# Patient Record
Sex: FEMALE | Race: WHITE
[De-identification: names, ages, dates, MRNs, and addresses within clinical notes are randomized per-mention and may not be internally consistent; named-entity substitution may affect disease eponyms.]

---

## 2018-09-17 ENCOUNTER — HOSPITAL ENCOUNTER (EMERGENCY)
Dept: HOSPITAL 97 - ER | Age: 83
Discharge: HOME | End: 2018-09-17
Payer: COMMERCIAL

## 2018-09-17 VITALS — SYSTOLIC BLOOD PRESSURE: 130 MMHG | OXYGEN SATURATION: 100 % | TEMPERATURE: 98.1 F | DIASTOLIC BLOOD PRESSURE: 80 MMHG

## 2018-09-17 DIAGNOSIS — Z95.818: ICD-10-CM

## 2018-09-17 DIAGNOSIS — T16.2XXA: Primary | ICD-10-CM

## 2018-09-17 DIAGNOSIS — I25.2: ICD-10-CM

## 2018-09-17 PROCEDURE — 99283 EMERGENCY DEPT VISIT LOW MDM: CPT

## 2018-09-17 NOTE — ER
Nurse's Notes                                                                                     

 [facility]                                                                

Name: Dnia Carson                                                                            

Age: 82 yrs                                                                                       

Sex: Female                                                                                       

: 1935                                                                                   

MRN: [id number]                                                                                   

Arrival Date: 2018                                                                          

Time: 19:11                                                                                       

Account#: [id number]                                                                            

Bed 11                                                                                            

Private MD: Karel Hernandez T                                                                        

Diagnosis: Foreign body in left ear                                                               

                                                                                                  

Presentation:                                                                                     

                                                                                             

19:47 Presenting complaint: Patient states: she feels there is mosquito on her left ear for   mg2 

      an hour already. Transition of care: patient was not received from another setting of       

      care. Onset of symptoms was 2018. Risk Assessment: Do you want to hurt        

      yourself or someone else? Patient reports no desire to harm self or others. Initial         

      Sepsis Screen: Does the patient meet any 2 criteria? No. Patient's initial sepsis           

      screen is negative. Does the patient have a suspected source of infection? No.              

      Patient's initial sepsis screen is negative. Care prior to arrival: None.                   

19:47 Method Of Arrival: Ambulatory                                                           mg2 

19:47 Acuity: REBEKAH 5                                                                           mg2 

                                                                                                  

Triage Assessment:                                                                                

20:39 General: Appears in no apparent distress. Behavior is calm, cooperative.                mg2 

                                                                                                  

Historical:                                                                                       

- Allergies:                                                                                      

19:49 No Known Allergies;                                                                     mg2 

- PMHx:                                                                                           

19:49 Diabetes - NIDDM; Myocardial infarction;                                                mg2 

- PSHx:                                                                                           

19:49 cardiac stents;                                                                         mg2 

                                                                                                  

- Immunization history:: Flu vaccine is not up to date.                                           

- Social history:: Smoking status: Patient/guardian denies using tobacco,                         

  Patient/guardian denies using alcohol, street drugs, IV drugs.                                  

- Ebola Screening: : No symptoms or risks identified at this time.                                

                                                                                                  

                                                                                                  

Screenin:38 Abuse screen: Denies threats or abuse. Denies injuries from another. Nutritional        mg2 

      screening: No deficits noted. Tuberculosis screening: No symptoms or risk factors           

      identified. Fall Risk None identified.                                                      

                                                                                                  

Assessment:                                                                                       

20:37 Reassessment: patient was relieved from the ns irrigation on her left ear. Pain: Denies mg2 

      pain. EENT: Ear canal clear on left ear.                                                    

                                                                                                  

Vital Signs:                                                                                      

19:48  / 80; Pulse 84; Resp 18; Temp 98.1; Pulse Ox 100% on R/A; Weight 70.31 kg;       mg2 

      Height 5 ft. 2 in. (157.48 cm);                                                             

19:48 Body Mass Index 28.35 (70.31 kg, 157.48 cm)                                             mg2 

                                                                                                  

ED Course:                                                                                        

19:11 Patient arrived in ED.                                                                  es  

19:12 Karel Hernandez MD is Private Physician.                                                   es  

19:30 Forrest Guardado, RN is Primary Nurse.                                                  mg2 

19:33 Ronnell Bobo MD is Attending Physician.                                            tw4 

19:36 Ronnell Bobo MD is Attending Physician.                                            tw4 

19:47 Triage completed.                                                                       mg2 

20:11 Karel Hernandez MD is Referral Physician.                                                  tw4 

20:38 No provider procedures requiring assistance completed. Patient did not have IV access   mg2 

      during this emergency room visit. Ear irrigation: Route left ear with Normal Saline         

      amount Other 100 Patient tolerated well.                                                    

20:39 Patient has correct armband on for positive identification. Pulse ox on. NIBP on.       mg2 

20:39 Arm band placed on.                                                                     mg2 

                                                                                                  

Administered Medications:                                                                         

No medications were administered                                                                  

                                                                                                  

                                                                                                  

Outcome:                                                                                          

20:12 Discharge ordered by MD.                                                                tw4 

20:39 Discharged to home ambulatory.                                                          mg2 

20:39 Condition: stable                                                                           

20:39 Discharge instructions given to patient, Instructed on discharge instructions, follow       

      up and referral plans. Demonstrated understanding of instructions, follow-up care.          

20:40 Patient left the ED.                                                                    mg2 

                                                                                                  

Signatures:                                                                                       

Valentine Dave                                                                                    

Ronnell Bobo MD MD   tw4                                                  

Forrest Guardado, RN                    RN   mg2                                                  

                                                                                                  

Corrections: (The following items were deleted from the chart)                                    

19:48 19:48  / 80; Pulse 84bpm; Resp 18bpm; Pulse Ox 100% RA; Temp 98.1F; mg2           mg2 

                                                                                                  

**************************************************************************************************

## 2018-09-17 NOTE — EDPHYS
Physician Documentation                                                                           

 [facility]                                                                

Name: Dina Carson                                                                            

Age: 82 yrs                                                                                       

Sex: Female                                                                                       

: 1935                                                                                   

MRN: [id number]                                                                                   

Arrival Date: 2018                                                                          

Time: 19:11                                                                                       

Account#: [id number]                                                                            

Bed 11                                                                                            

Private MD: Karel Hernandez T                                                                        

ED Physician Ronnell Bobo                                                                     

HPI:                                                                                              

                                                                                             

03:42 This 82 yrs old  Female presents to ER via Ambulatory with complaints of       tw4 

      Foreign Body In Ear.                                                                        

03:42 The patient presents with a foreign body sensation, presumably from an insect. The      tw4 

      complaints affect the left ear. Onset: The symptoms/episode began/occurred just prior       

      to arrival.                                                                                 

                                                                                                  

Historical:                                                                                       

- Allergies:                                                                                      

                                                                                             

19:49 No Known Allergies;                                                                     mg2 

- PMHx:                                                                                           

19:49 Diabetes - NIDDM; Myocardial infarction;                                                mg2 

- PSHx:                                                                                           

19:49 cardiac stents;                                                                         mg2 

                                                                                                  

- Immunization history:: Flu vaccine is not up to date.                                           

- Social history:: Smoking status: Patient/guardian denies using tobacco,                         

  Patient/guardian denies using alcohol, street drugs, IV drugs.                                  

- Ebola Screening: : No symptoms or risks identified at this time.                                

                                                                                                  

                                                                                                  

Vital Signs:                                                                                      

19:48  / 80; Pulse 84; Resp 18; Temp 98.1; Pulse Ox 100% on R/A; Weight 70.31 kg;       mg2 

      Height 5 ft. 2 in. (157.48 cm);                                                             

19:48 Body Mass Index 28.35 (70.31 kg, 157.48 cm)                                             mg2 

                                                                                                  

MDM:                                                                                              

19:40 Patient medically screened.                                                             tw4 

20:11 Data reviewed: vital signs, nurses notes. Counseling: I had a detailed discussion with  tw4 

      the patient and/or guardian regarding: the historical points, exam findings, and any        

      diagnostic results supporting the discharge/admit diagnosis. Special discussion: I          

      discussed with the patient/guardian in detail that at this point there is no indication     

      for admission to the hospital. It is understood, however, that if the symptoms persist      

      or worsen the patient needs to return immediately for re-evaluation.                        

                                                                                                  

Administered Medications:                                                                         

No medications were administered                                                                  

                                                                                                  

                                                                                                  

Disposition:                                                                                      

18 20:12 Discharged to Home. Impression: Foreign body in left ear.                          

- Condition is Stable.                                                                            

- Discharge Instructions: Ear Foreign Body, Easy-to-Read.                                         

                                                                                                  

- Medication Reconciliation Form, Thank You Letter, Antibiotic Education, Prescription            

  Opioid Use form.                                                                                

- Follow up: Karel Hernandez MD; When: Upon discharge from the Emergency Department;                 

  Reason: Recheck today's complaints, Re-evaluation by your physician.                            

- Problem is new.                                                                                 

- Symptoms have improved.                                                                         

                                                                                                  

                                                                                                  

                                                                                                  

Signatures:                                                                                       

Ronnell Bobo MD MD   tw4                                                  

Forrest Guardado, RN                    RN   mg2                                                  

                                                                                                  

Corrections: (The following items were deleted from the chart)                                    

20:40 20:12 2018 20:12 Discharged to Home. Impression: Foreign body in left ear.        mg2 

      Condition is Stable. Forms are Medication Reconciliation Form, Thank You Letter,            

      Antibiotic Education, Prescription Opioid Use. Follow up: Karel Hernandez; When: Upon            

      discharge from the Emergency Department; Reason: Recheck today's complaints,                

      Re-evaluation by your physician. Problem is new. Symptoms have improved. tw4                

                                                                                                  

**************************************************************************************************

## 2021-12-06 ENCOUNTER — HOSPITAL ENCOUNTER (OUTPATIENT)
Dept: HOSPITAL 97 - DS | Age: 86
Discharge: HOME | End: 2021-12-06
Attending: INTERNAL MEDICINE
Payer: COMMERCIAL

## 2021-12-06 VITALS — OXYGEN SATURATION: 97 % | TEMPERATURE: 98 F

## 2021-12-06 VITALS — BODY MASS INDEX: 28.3 KG/M2

## 2021-12-06 VITALS — DIASTOLIC BLOOD PRESSURE: 51 MMHG | SYSTOLIC BLOOD PRESSURE: 142 MMHG

## 2021-12-06 DIAGNOSIS — N17.9: Primary | ICD-10-CM

## 2021-12-06 PROCEDURE — 0TB13ZX EXCISION OF LEFT KIDNEY, PERCUTANEOUS APPROACH, DIAGNOSTIC: ICD-10-PCS

## 2021-12-06 PROCEDURE — 88300 SURGICAL PATH GROSS: CPT

## 2021-12-06 PROCEDURE — 77012 CT SCAN FOR NEEDLE BIOPSY: CPT

## 2021-12-06 PROCEDURE — BT22ZZZ COMPUTERIZED TOMOGRAPHY (CT SCAN) OF LEFT KIDNEY: ICD-10-PCS

## 2021-12-06 PROCEDURE — 50200 RENAL BIOPSY PERQ: CPT

## 2021-12-06 NOTE — RAD REPORT
EXAM DESCRIPTION:  CT - Renal Biopsy CT - 12/6/2021 10:59 am

 

CLINICAL HISTORY:  ACUTE RENAL FAILURE

Flank pain

 

COMPARISON:  Urinary Bladder dated 7/21/2021

 

FINDINGS:  Preoperative diagnosis: Acute renal failure

Post operative diagnosis: Same

Conscious Sedation: 45 minutes of IV conscious sedation utilizing fentanyl and midazolam.

Patient was continuously monitored by nursing staff.

 

Contrast used: NONE

Estimated blood loss: less than 5 mL

Specimens: 2 x 18 gauge core specimens

 

The patient was placed prone on the table and the left posterior flank area was prepped and draped in
 the usual sterile fashion. 1% lidocaine was infiltrated into the subcutaneous tissues for local anes
thesia. Under computed tomographic guidance, a 17 gauge introducer was advanced into the lower pole l
eft kidney. Subsequently, a 18 gauge, 15 cm long, 20 mm throw core biopsy gun was advanced into the l
esion and 2 cores were obtained.

 

Postprocedure imaging demonstrated no complications. Samples were given to pathology for analysis. Th
e patient tolerated the procedure without immediate complication and transferred to the recovery room
 in stable condition.

 

 

IMPRESSION:  Successful CT-guided nonfocal left renal biopsy.

 

45 minutes of monitored IV conscious sedation was utilized.

 

All CT scans are performed using dose optimization technique as appropriate and may include automated
 exposure control or mA/KV adjustment according to patient size.

## 2024-09-10 LAB
ANION GAP SERPL CALC-SCNC: 9.3 MEQ/L (ref 5–15)
APTT BLD: 34.9 SECONDS (ref 24.3–36.9)
BUN BLD-MCNC: 20 MG/DL (ref 7–18)
GLUCOSE SERPLBLD-MCNC: 98 MG/DL (ref 74–106)
HCT VFR BLD CALC: 38.3 % (ref 36–45)
HGB BLD-MCNC: 12.6 G/DL (ref 12–15)
INR BLD: 1.03
LYMPHOCYTES # SPEC AUTO: 1.5 K/UL (ref 0.7–4.9)
MCH RBC QN AUTO: 29.2 PG (ref 27–35)
MCHC RBC AUTO-ENTMCNC: 32.9 G/DL (ref 32–36)
MCV RBC: 88.9 FL (ref 80–100)
NRBC # BLD: 0 10*3/UL (ref 0–0)
NRBC BLD AUTO-RTO: 0.1 % (ref 0–0)
PMV BLD: 7.2 FL (ref 7.6–11.3)
POTASSIUM SERPL-SCNC: 5.3 MEQ/L (ref 3.5–5.1)
PROTHROMBIN TIME: 11.5 SECONDS (ref 9.4–12.5)
RBC # BLD: 4.3 M/UL (ref 3.86–4.86)
WBC # BLD AUTO: 5.6 THOU/UL (ref 4.3–10.9)

## 2024-09-10 NOTE — RAD REPORT
EXAM DESCRIPTION:  RAD - Chest Pa And Lat (2 Views) - 9/10/2024 2:30 pm

 

CLINICAL HISTORY:  pre op

Chest pain.

 

COMPARISON:  Chest Pa And Lat (2 Views) dated 2/16/2023; Chest Pa And Lat (2 Views) dated 12/4/2019; 
CHEST PA AND LAT 2 VIEW dated 3/1/2016; CHEST PA AND LAT 2 VIEW dated 4/20/2010

 

TECHNIQUE:  PA and lateral views of the chest were obtained.

 

FINDINGS:  The lungs are hyperexpanded compatible with COPD. The heart is upper limit of normal in si
ze. No fracture or aggressive bony process. Small hiatal hernia.

 

IMPRESSION:  COPD without acute process identified.

## 2024-09-10 NOTE — EKG
Test Date:    2024-09-10               Test Time:    14:19:16

Technician:   ROSHAN                                     

                                                     

MEASUREMENT RESULTS:                                       

Intervals:                                           

Rate:         78                                     

AL:           176                                    

QRSD:         76                                     

QT:           374                                    

QTc:          426                                    

Axis:                                                

P:            31                                     

AL:           176                                    

QRS:          15                                     

T:            20                                     

                                                     

INTERPRETIVE STATEMENTS:                                       

                                                     

Normal sinus rhythm

Cannot rule out Anterior infarct, age undetermined

Abnormal ECG

Compared to ECG 04/16/2006 04:46:00

No significant changes



Electronically Signed On 09-10-24 16:52:09 CDT by Aiden Stauffer

## 2024-09-12 ENCOUNTER — HOSPITAL ENCOUNTER (OUTPATIENT)
Dept: HOSPITAL 97 - CCL | Age: 89
Discharge: HOME | End: 2024-09-12
Attending: INTERNAL MEDICINE
Payer: COMMERCIAL

## 2024-09-12 VITALS — SYSTOLIC BLOOD PRESSURE: 154 MMHG | DIASTOLIC BLOOD PRESSURE: 74 MMHG

## 2024-09-12 VITALS — OXYGEN SATURATION: 95 %

## 2024-09-12 DIAGNOSIS — Z79.899: ICD-10-CM

## 2024-09-12 DIAGNOSIS — E78.5: ICD-10-CM

## 2024-09-12 DIAGNOSIS — Z79.84: ICD-10-CM

## 2024-09-12 DIAGNOSIS — N18.30: ICD-10-CM

## 2024-09-12 DIAGNOSIS — Z87.891: ICD-10-CM

## 2024-09-12 DIAGNOSIS — E11.22: ICD-10-CM

## 2024-09-12 DIAGNOSIS — I12.9: ICD-10-CM

## 2024-09-12 DIAGNOSIS — I34.0: ICD-10-CM

## 2024-09-12 DIAGNOSIS — I25.10: ICD-10-CM

## 2024-09-12 DIAGNOSIS — I65.22: ICD-10-CM

## 2024-09-12 DIAGNOSIS — I70.213: Primary | ICD-10-CM

## 2024-09-12 DIAGNOSIS — Z95.5: ICD-10-CM

## 2024-09-12 PROCEDURE — 36415 COLL VENOUS BLD VENIPUNCTURE: CPT

## 2024-09-12 PROCEDURE — 85610 PROTHROMBIN TIME: CPT

## 2024-09-12 PROCEDURE — 75630 X-RAY AORTA LEG ARTERIES: CPT

## 2024-09-12 PROCEDURE — 80048 BASIC METABOLIC PNL TOTAL CA: CPT

## 2024-09-12 PROCEDURE — 85730 THROMBOPLASTIN TIME PARTIAL: CPT

## 2024-09-12 PROCEDURE — 99152 MOD SED SAME PHYS/QHP 5/>YRS: CPT

## 2024-09-12 PROCEDURE — 71046 X-RAY EXAM CHEST 2 VIEWS: CPT

## 2024-09-12 PROCEDURE — 99153 MOD SED SAME PHYS/QHP EA: CPT

## 2024-09-12 PROCEDURE — 85025 COMPLETE CBC W/AUTO DIFF WBC: CPT

## 2024-09-12 PROCEDURE — 36200 PLACE CATHETER IN AORTA: CPT

## 2024-09-12 PROCEDURE — 93005 ELECTROCARDIOGRAM TRACING: CPT

## 2024-09-12 PROCEDURE — 76937 US GUIDE VASCULAR ACCESS: CPT

## 2024-09-17 NOTE — OP
Date of Procedure:  09/12/2024



Surgeon:  Selvin Velazquez



Procedure Performed:  Abdominal aortogram with peripheral runoff.



Indication For Procedure:  Claudication, abnormal MISTI.



Complications:  None.



Estimated Blood Loss:  Less than 50 cc.



Access:  Right radial, closed by TR band.



Sedation Time:  20 minutes with 1 of Versed and 25 of fentanyl.



Description Of Procedure:  After risks, and benefits, and alternatives were explained to the patient,
 patient agreed to proceed with the procedure and signed informed consent.  The patient was brought b
[facility] to the cath lab, prepped and draped in a sterile fashion.  Time-out was performed.  Sedation was 
administered.  Next, the right radial access was obtained.  The pigtail catheter was advanced to the 
lower abdominal aorta for the lower abdominal aortogram and the peripheral runoff.  At the end of pro
cedure, catheter was removed over a J-wire.  Sheath was removed.  TR band was applied.  Hemostasis wa
s achieved and patient was moved back to recovery in stable condition.



Findings:  

1.Lower abdominal aorta patent.

2.Right common iliac artery patent.

3.Right external iliac artery patent.

4.Right SFA, mid to distal 80% disease.

5.Right AT/PT/peroneal patent with diffuse mild luminal irregularities.

6.Left common iliac patent.

7.Left external iliac artery patent.

8.Left SFA, diffuse 40% disease.

9.Left AT/PT/peroneal patent with diffuse moderate disease.



Assessment And Plan:  Significant right SFA disease. 



Plan will be:

1.To stage PTA of the right SFA through left common femoral artery access.

2.Continue medical management at the same time.





HA/NANCY

DD:  09/17/2024 11:56:34Voice ID:  [id number]

DT:  09/17/2024 12:26:33Report ID:  [id number]

## 2024-10-01 ENCOUNTER — HOSPITAL ENCOUNTER (EMERGENCY)
Dept: HOSPITAL 97 - ER | Age: 89
Discharge: HOME | End: 2024-10-01
Payer: COMMERCIAL

## 2024-10-01 VITALS — SYSTOLIC BLOOD PRESSURE: 147 MMHG | DIASTOLIC BLOOD PRESSURE: 61 MMHG

## 2024-10-01 VITALS — TEMPERATURE: 97.3 F | OXYGEN SATURATION: 97 %

## 2024-10-01 DIAGNOSIS — I71.43: Primary | ICD-10-CM

## 2024-10-01 DIAGNOSIS — E11.65: ICD-10-CM

## 2024-10-01 DIAGNOSIS — I10: ICD-10-CM

## 2024-10-01 LAB — UA DIPSTICK W REFLEX MICRO PNL UR: (no result)

## 2024-10-01 PROCEDURE — 73502 X-RAY EXAM HIP UNI 2-3 VIEWS: CPT

## 2024-10-01 PROCEDURE — 99284 EMERGENCY DEPT VISIT MOD MDM: CPT

## 2024-10-01 PROCEDURE — 96372 THER/PROPH/DIAG INJ SC/IM: CPT

## 2024-10-01 PROCEDURE — 74176 CT ABD & PELVIS W/O CONTRAST: CPT

## 2024-10-01 PROCEDURE — 72170 X-RAY EXAM OF PELVIS: CPT

## 2024-10-01 PROCEDURE — 81003 URINALYSIS AUTO W/O SCOPE: CPT

## 2024-10-01 NOTE — EDPHYS
Physician Documentation                                                                           

 [facility]                                                                 

Name: Dina Carson                                                                            

Age: 88 yrs                                                                                       

Sex: Female                                                                                       

: 1935                                                                                   

MRN: [id number]                                                                                   

Arrival Date: 10/01/2024                                                                          

Time: 11:42                                                                                       

Account#: [id number]                                                                            

Bed 12                                                                                            

Private MD:                                                                                       

[facility] Physician Julito Harley                                                                      

HPI:                                                                                              

10/01                                                                                             

16:14 This 88 yrs old Female presents to ER via Ambulatory with complaints of Hip Pain.       rachel 

16:14 The patient or guardian reports decreased range of motion, pain. that occurred at home, rachel 

      sustained from unknown reason, There is no obvious deformity, The patient is able to        

      self ambulate. The patient is able to bear their full body weight. There is no              

      radiation of the patient's discomfort. The complaints affect the left hip. Onset: The       

      symptoms/episode began/occurred today.                                                      

                                                                                                  

Historical:                                                                                       

- Allergies:                                                                                      

12:17 No Known Allergies;                                                                     tm6 

- PMHx:                                                                                           

12:17 Diabetes - NIDDM; Myocardial infarction; End stage renal disease;                       tm6 

- PSHx:                                                                                           

12:17 coronary stents; plates in left thigh and right hip;                                    tm6 

                                                                                                  

- Immunization history:: Client reports receiving the 2nd dose of the Covid vaccine.              

- Infectious Disease History:: Denies.                                                            

- Social history:: Smoking status: Patient/guardian denies using tobacco, the patient             

  reports quitting approximately 30 years ago, Patient/guardian denies using alcohol.             

                                                                                                  

                                                                                                  

ROS:                                                                                              

16:16 Constitutional: Negative for fever, chills, and weight loss, Eyes: Negative for injury, rachel 

      pain, redness, and discharge, ENT: Negative for injury, pain, and discharge, Neck:          

      Negative for injury, pain, and swelling, Cardiovascular: Negative for chest pain,           

      palpitations, and edema, Respiratory: Negative for shortness of breath, cough,              

      wheezing, and pleuritic chest pain, Abdomen/GI: Negative for abdominal pain, nausea,        

      vomiting, diarrhea, and constipation, Back: Negative for injury and pain, : Negative      

      for injury, bleeding, discharge, and swelling, Skin: Negative for injury, rash, and         

      discoloration, Neuro: Negative for headache, weakness, numbness, tingling, and seizure,     

      Psych: Negative for depression, anxiety, suicide ideation, homicidal ideation, and          

      hallucinations, Allergy/Immunology: Negative for hives, rash, and allergies, Endocrine:     

      Negative for neck swelling, polydipsia, polyuria, polyphagia, and marked weight             

      changes, Hematologic/Lymphatic: Negative for swollen nodes, abnormal bleeding, and          

      unusual bruising,                                                                           

16:16 Back: Positive for decreased range of motion, pain at rest, pain with movement,             

16:16 MS/extremity: Positive for decreased range of motion, pain, tenderness, of the right        

      hip,                                                                                        

                                                                                                  

Exam:                                                                                             

16:16 Constitutional:  This is a well developed, well nourished patient who is awake, alert,  rachel 

      and in no acute distress. Head/Face:  Normocephalic, atraumatic. Eyes:  Pupils equal        

      round and reactive to light, extra-ocular motions intact.  Lids and lashes normal.          

      Conjunctiva and sclera are non-icteric and not injected.  Cornea within normal limits.      

      Periorbital areas with no swelling, redness, or edema. ENT:  Nares patent. No nasal         

      discharge, no septal abnormalities noted.  Tympanic membranes are normal and external       

      auditory canals are clear.  Oropharynx with no redness, swelling, or masses, exudates,      

      or evidence of obstruction, uvula midline.  Mucous membranes moist. Neck:  Trachea          

      midline, no thyromegaly or masses palpated, and no cervical lymphadenopathy.  Supple,       

      full range of motion without nuchal rigidity, or vertebral point tenderness.  No            

      Meningismus. Chest/axilla:  Normal chest wall appearance and motion.  Nontender with no     

      deformity.  No lesions are appreciated. Cardiovascular:  Regular rate and rhythm with a     

      normal S1 and S2.  No gallops, murmurs, or rubs.  Normal PMI, no JVD.  No pulse             

      deficits. Respiratory:  Lungs have equal breath sounds bilaterally, clear to                

      auscultation and percussion.  No rales, rhonchi or wheezes noted.  No increased work of     

      breathing, no retractions or nasal flaring. Abdomen/GI:  Soft, non-tender, with normal      

      bowel sounds.  No distension or tympany.  No guarding or rebound.  No evidence of           

      tenderness throughout. Back:  No spinal tenderness.  No costovertebral tenderness.          

      Full range of motion. Female :  Normal external genitalia. Skin:  Warm, dry with          

      normal turgor.  Normal color with no rashes, no lesions, and no evidence of cellulitis.     

      Neuro:  Awake and alert, GCS 15, oriented to person, place, time, and situation.            

      Cranial nerves II-XII grossly intact.  Motor strength 5/5 in all extremities.  Sensory      

      grossly intact.  Cerebellar exam normal.  Normal gait. Psych:  Awake, alert, with           

      orientation to person, place and time.  Behavior, mood, and affect are within normal        

      limits.                                                                                     

16:16 Musculoskeletal/extremity: Extremities: grossly normal except: noted in the left leg:       

      decreased ROM, pain, ROM: intact in all extremities, full active range of motion, full      

      passive range of motion, Circulation is intact in all extremities. Sensation intact.        

      Compartment Syndrome exam of affected extremity: is normal. no pain, no numbness, no        

      tingling, no sensation deficit, no palor, no weak pulses, Weight bearing: able to fully     

      bear weight, without difficulty, DVT Exam: No signs of deep vein thrombosis. no pain,       

      no swelling, no tenderness, negative Homans' sign noted on exam, no appreciated bluish      

      discoloration, no erythema, no increased warmth,                                            

                                                                                                  

Vital Signs:                                                                                      

12:15 Temp 97.3(TE);                                                                          tm6 

12:15  / 73; Pulse 97; Resp 18; Pulse Ox 97% on R/A; MAP 93 mmHg; Weight 68.04 kg;      tm6 

      Height 5 ft. 2 in. ; Pain 0/10;                                                             

15:47  / 61; Pulse 74; Resp 16; Pulse Ox 97% on R/A;                                    jb4 

12:15 Body Mass Index 27.44 (68.04 kg, 157.48 cm)                                             tm6 

12:15 Pain Scale: Adult                                                                       tm6 

                                                                                                  

MDM:                                                                                              

12:19 Patient medically screened.                                                             rachel 

16:18 Differential diagnosis: hip fracture, intertrochanteric fracture, femoral neck          rachel 

      fracture, femoral shaft fracture, bursitis, arthritis, strain, Fatigue. Data reviewed:      

      vital signs, nurses notes, lab test result(s), urinalysis. Consideration of                 

      Admission/Observation Escalation of care including admission/observation considered. I      

      considered the following discharge prescriptions or medication management in the            

      emergency department Medications were administered in the Emergency Department. See         

      MAR. Independent interpretation of the following test(s) in the Emergency Department        

      X-Ray: My interpretation is pelvis, hips, ct ab/pel. Test considered but Not performed:     

      Labs: no cbc, no comp met. Historians other than the Patient: Family Member: sons well      

      informed. Care significantly affected by the following chronic conditions: Diabetes,        

      Hypertension, Chronic Kidney Disease. Counseling: I had a detailed discussion with the      

      patient and/or guardian regarding the historical points, exam findings, and any             

      diagnostic results supporting the discharge/admit diagnosis, radiology results, the         

      need for outpatient follow up, for definitive care, a cardiologist.                         

                                                                                                  

10/01                                                                                             

14:06 Order name: Urinalysis w/ reflexes; Complete Time: 16:24                                [facility] 

10/01                                                                                             

14:06 Order name: CT Abd/Pelvis - Without Contrast; Complete Time: 15:28                      [facility] 

10/01                                                                                             

14:06 Order name: Pelvis XRAY; Complete Time: 16:24                                           [facility] 

10/01                                                                                             

14:06 Order name: Hip Left 2 View XRAY; Complete Time: 16:24                                  [facility] 

10/01                                                                                             

14:06 Order name: Hip Right 2 View XRAY; Complete Time: 16:24                                 [facility] 

                                                                                                  

Administered Medications:                                                                         

14:51 Drug: Diazepam PO 5 mg PO once Route: PO;                                               jl7 

15:44 Not Given (Patient Refused): ns 0.9% 500 ml IV at bolus once                            jb4 

15:44 Not Given (Patient Refused): morphineor iv 2 mg IVP once over 4 mins                    jb4 

15:44 Not Given (Patient Refused): ondansetron 4 mg IVP once; over 2 minutes                  jb4 

15:44 Not Given (Duplicate Order): Decadron - [id number] mg IVP once                     jb4 

15:44 Drug: Dexamethasone IM 10 mg IM once Route: IM; Site: right deltoid;                    jb4 

15:52 Drug: ToPROL XL PO 25 mg PO once Route: PO;                                             jb4 

                                                                                                  

                                                                                                  

Disposition Summary:                                                                              

10/01/24 16:26                                                                                    

Discharge Ordered                                                                                 

 Notes:       Location: Home                                                                        
  rachel

      Problem: new                                                                            rachel 

      Symptoms: have improved                                                                 rachel 

      Condition: Stable                                                                       rachel 

      Diagnosis                                                                                   

        - Essential (primary) hypertension                                                    rachel 

        - Abdominal aortic aneurysm, without rupture - 4.3 cm, saccular, infrarenal           rachel 

        - Type 2 diabetes mellitus with hyperglycemia                                         rachel 

      Followup:                                                                               rachel 

        - With: Private Physician                                                                  

        - When: 2 - 3 days                                                                         

        - Reason: Recheck today's complaints, Continuance of care, Re-evaluation by your           

      physician                                                                                   

      Followup:                                                                               rachel 

        - With: Aiden Stauffer MD                                                                 

        - When: 2 - 3 days                                                                         

        - Reason: Recheck today's complaints, Re-evaluation by your physician                      

      Discharge Instructions:                                                                     

        - Discharge Summary Sheet                                                             rachel 

        - Abdominal Aortic Aneurysm                                                           rachel 

        - Hyperglycemia                                                                       rachel 

        - Hypertension, Adult                                                                 rachel 

        - Hypertension, Adult, Easy-to-Read                                                   rachel 

        - Diabetes Mellitus and Nutrition, Adult                                              rachel 

        - How to Take Your Blood Pressure, Easy-to-Read                                       rachel 

        - Managing Your Hypertension                                                          rachle 

      Forms:                                                                                      

        - Medication Reconciliation Form                                                      rachel 

        - Antibiotic Education                                                                rachel 

        - Prescription Opioid Use                                                             rachel 

        - Patient Portal Instructions                                                         rachel 

        - Leadership Thank You Letter                                                         rachel 

      Prescriptions:                                                                              

        - acetaminophen-codeine 300-30 mg Oral tablet                                              

            - take 1 tablet ORAL route every 6 hours; 20 tablet; Refills: 0, Product          rachel 

      Selection Permitted                                                                         

        - Toprol XL 25 mg Oral Tablet                                                              

            - take 1 tablet ORAL route once daily; 20 tablet; Refills: 0, Product Selection   rachel 

      Permitted                                                                                   

Signatures:                                                                                       

Dispatcher [employer]                           EDMS                                                 

Julito Harley MD MD cha Bryson, James, RN                       RN   jb4                                                  

Mar Rosas RN                        RN   jl7                                                  

Davide Minaya RN                   RN   tm6                                                  

                                                                                                  

Corrections: (The following items were deleted from the chart)                                    

14: 14:06 CBC+H.LAB.BRZ ordered. EDMS                                                       EDMS

14: 14:06 COMPREHENSIVE METABOLIC PANEL+C.LAB.BRZ ordered. EDMS                             EDMS

14: 14:06 Urinalysis+U.LAB.BRZ ordered. EDMS                                                EDMS

14: 14:07 Abdomen Pelvis Wo Con+CT.RAD.BRZ ordered. EDMS                                    EDMS

14: 14:07 Pelvis+RAD.RAD.BRZ ordered. EDMS                                                  EDMS

14: 14:07 Hip Left 2 View+RAD.RAD.BRZ ordered. EDMS                                         EDMS

14: 14:07 Hip Right 2 View+RAD.RAD.BRZ ordered. EDMS                                        EDMS

                                                                                                  

**************************************************************************************************

## 2024-10-01 NOTE — RAD REPORT
EXAMINATION: XR PELVIS



CLINICAL INDICATION: Female, 88 years old. [facility] MAIN

 PAIN

 Bed Name: 



TECHNIQUE: AP Pelvis radiograph was obtained. 



COMPARISON: 10/1/2024 CT abdomen and pelvis



FINDINGS: No evidence of fracture or dislocation. Right hip nail and cannulated screw fixation. Symph
ysis pubis degenerative changes. Normal alignment. No evidence of AVN. Soft tissues are

unremarkable.



IMPRESSION:

No acute findings. Other findings as above..



Reported By: Sudarshan Melendez 

Electronically Signed:  10/1/2024 4:10 PM

## 2024-10-01 NOTE — RAD REPORT
EXAMINATION: Hip Right 2 View



CLINICAL INDICATION: Female, 88 years old. [facility] MAIN

 PAIN

 Bed Name: 12



TECHNIQUE: 2 view radiograph of the right hip were obtained. 



COMPARISON: No prior exam.



FINDINGS: No evidence of fracture or dislocation. Sequelae of hardware fixation with some deformity j
ust caudal to the base of the lesser trochanter suggesting prior healing. Normal alignment. No

evidence of arthropathy or other focal bone lesion. Soft tissues are unremarkable.



IMPRESSION:

No acute osseous abnormality. Internal Fixation hardware as above.



Reported By: Sudarshan Melendez 

Electronically Signed:  10/1/2024 4:11 PM

## 2024-10-01 NOTE — ER
Nurse's Notes                                                                                     

 [facility]                                                                 

Name: Dina Carson                                                                            

Age: 88 yrs                                                                                       

Sex: Female                                                                                       

: 1935                                                                                   

MRN: [id number]                                                                                   

Arrival Date: 10/01/2024                                                                          

Time: 11:42                                                                                       

Account#: [id number]                                                                            

Bed 12                                                                                            

Private MD:                                                                                       

Diagnosis: Essential (primary) hypertension;Abdominal aortic aneurysm, without rupture-4.3 cm,    

  saccular, infrarenal;Type 2 diabetes mellitus with hyperglycemia                                

                                                                                                  

Presentation:                                                                                     

10/01                                                                                             

12:16 Chief complaint: Patient states: left hip pain started yesterday at noon. Sitting hip   tm6 

      does not hurt, but any movement is 10/10 pain. Coronavirus screen: Vaccine status:          

      Patient reports receiving the 2nd dose of the covid vaccine. Ebola Screen: Patient          

      negative for fever greater than or equal to 101.5 degrees Fahrenheit, and additional        

      compatible Ebola Virus Disease symptoms Patient denies exposure to infectious person.       

      Patient denies travel to an Ebola-affected area in the 21 days before illness onset. No     

      symptoms or risks identified at this time. Initial Sepsis Screen: Does the patient meet     

      any 2 criteria? No. Patient's initial sepsis screen is negative. Does the patient have      

      a suspected source of infection? No. Patient's initial sepsis screen is negative. Risk      

      Assessment: Do you want to hurt yourself or someone else? Patient reports no desire to      

      harm self or others. Onset of symptoms was 2024.                              

12:16 Method Of Arrival: Ambulatory                                                           tm6 

12:16 Acuity: REBEKAH 4                                                                           tm6 

                                                                                                  

Triage Assessment:                                                                                

12:17 General: Appears in no apparent distress. Behavior is calm, cooperative. Pain:          tm6 

      Complains of pain in left hip Pain currently is 0 out of 10 on a pain scale. at worst       

      was 10 out of 10 on a pain scale. Pain began 1 day ago. EENT: No signs and/or symptoms      

      were reported regarding the EENT system. Neuro: Level of Consciousness is awake, alert,     

      obeys commands, Oriented to person, place, time, situation. Cardiovascular: Patient's       

      skin is warm and dry. Respiratory: Airway is patent Respiratory effort is even,             

      unlabored, Respiratory pattern is regular, symmetrical. GI: No signs and/or symptoms        

      were reported involving the gastrointestinal system. Abdomen is flat, non-distended.        

      : No signs and/or symptoms were reported regarding the genitourinary system. Derm: No     

      signs and/or symptoms reported regarding the dermatologic system. Musculoskeletal:          

      Reports pain in left hip since yesterday.                                                   

                                                                                                  

Historical:                                                                                       

- Allergies:                                                                                      

12:17 No Known Allergies;                                                                     tm6 

- PMHx:                                                                                           

12:17 Diabetes - NIDDM; Myocardial infarction; End stage renal disease;                       tm6 

- PSHx:                                                                                           

12:17 coronary stents; plates in left thigh and right hip;                                    tm6 

                                                                                                  

- Immunization history:: Client reports receiving the 2nd dose of the Covid vaccine.              

- Infectious Disease History:: Denies.                                                            

- Social history:: Smoking status: Patient/guardian denies using tobacco, the patient             

  reports quitting approximately 30 years ago, Patient/guardian denies using alcohol.             

                                                                                                  

                                                                                                  

Screenin:52 Abuse screen: Denies threats or abuse. Denies injuries from another. Nutritional        jl7 

      screening: No deficits noted. Tuberculosis screening: No symptoms or risk factors           

      identified.                                                                                 

16:43 [facility] ED Fall Risk Assessment (Adult) History of falling in the last 3 months,       jb4 

      including since admission No falls in past 3 months (0 pts) Confusion or Disorientation     

      No (0 pts) Intoxicated or Sedated No (0 pts) Impaired Gait No (0 pts) Mobility Assist       

      Device Used No (0 pt) Altered Elimination No (0 pt) Score/Fall Risk Level 0 - 2 = Low       

      Risk.                                                                                       

                                                                                                  

Assessment:                                                                                       

14:52 General: Appears in no apparent distress. uncomfortable, Behavior is calm, cooperative, jl7 

      appropriate for age. Pain: Complains of pain in left hip Pain currently is 0 out of 10      

      on a pain scale. Neuro: Level of Consciousness is awake, alert, obeys commands,             

      Oriented to person, place, time, situation. Cardiovascular: Patient's skin is warm and      

      dry. Respiratory: Airway is patent Respiratory effort is even, unlabored, Respiratory       

      pattern is regular, symmetrical. Derm: Skin is pink, warm \T\ dry.                          

16:43 Reassessment: Patient appears in no apparent distress at this time. Patient and/or      jb4 

      family updated on plan of care and expected duration. Pain level reassessed. Patient is     

      alert, oriented x 3, equal unlabored respirations, skin warm/dry/pink.                      

                                                                                                  

Vital Signs:                                                                                      

12:15 Temp 97.3(TE);                                                                          tm6 

12:15  / 73; Pulse 97; Resp 18; Pulse Ox 97% on R/A; MAP 93 mmHg; Weight 68.04 kg;      tm6 

      Height 5 ft. 2 in. ; Pain 0/10;                                                             

15:47  / 61; Pulse 74; Resp 16; Pulse Ox 97% on R/A;                                    jb4 

12:15 Body Mass Index 27.44 (68.04 kg, 157.48 cm)                                             tm6 

12:15 Pain Scale: Adult                                                                       tm6 

                                                                                                  

ED Course:                                                                                        

11:57 Patient arrived in ED.                                                                  im  

12:17 Triage completed.                                                                       tm6 

12:17 Arm band placed on right wrist.                                                         tm6 

12:19 Julito Harley MD is Attending Physician.                                             rachel 

14:16 CT Abd/Pelvis - Without Contrast In Process Unspecified.                                EDMS

14:37 Pelvis XRAY In Process Unspecified.                                                     EDMS

14:37 Hip Left 2 View XRAY In Process Unspecified.                                            EDMS

14:37 Hip Right 2 View XRAY In Process Unspecified.                                           EDMS

14:52 Patient has correct armband on for positive identification. Bed in low position. Call   jl7 

      light in reach. Side rails up X 1. Provided Education on: use of call bell.                 

15:46 Urine collected: clean catch specimen, clear.                                           tm3 

16:25 Aiden Stauffer MD is Referral Physician.                                               [facility] 

16:43 No provider procedures requiring assistance completed. Patient did not have IV access   jb4 

      during this emergency room visit.                                                           

                                                                                                  

Administered Medications:                                                                         

14:51 Drug: Diazepam PO 5 mg PO once Route: PO;                                               jl7 

15:44 Not Given (Patient Refused): ns 0.9% 500 ml IV at bolus once                            jb4 

15:44 Not Given (Patient Refused): morphineor iv 2 mg IVP once over 4 mins                    jb4 

15:44 Not Given (Patient Refused): ondansetron 4 mg IVP once; over 2 minutes                  jb4 

15:44 Not Given (Duplicate Order): Decadron - [id number] mg IVP once                     jb4 

15:44 Drug: Dexamethasone IM 10 mg IM once Route: IM; Site: right deltoid;                    jb4 

15:52 Drug: ToPROL XL PO 25 mg PO once Route: PO;                                             jb4 

                                                                                                  

                                                                                                  

Medication:                                                                                       

14:52 VIS not applicable for this client.                                                     jl7 

                                                                                                  

Outcome:                                                                                          

16:26 Discharge ordered by MD.                                                                rachel 

16:43 Discharged to home via wheelchair, with family,                                         jb4 

16:43 Condition: stable                                                                           

16:43 Discharge instructions given to patient, Instructed on discharge instructions, follow       

      up and referral plans. medication usage, Demonstrated understanding of instructions,        

      follow-up care, medications, Prescriptions given X 2,                                       

16:44 Patient left the ED.                                                                    jb4 

                                                                                                  

Signatures:                                                                                       

Dispatcher [employer]                           EDMS                                                 

Phil Morris                                tm3                                                  

Julito Harley MD MD cha Bryson, James RN                       RN   jb4                                                  

Mar Rosas RN                        RN   jl7                                                  

Yesenia Pearl Tawney RN                   RN   tm6                                                  

                                                                                                  

**************************************************************************************************

## 2024-10-01 NOTE — RAD REPORT
EXAMINATION: Abdomen   Pelvis Wo Contrast



CLINICAL INDICATION: Female, 88 years old. Abd pain;Flank pain 



TECHNIQUE: CT abdomen and pelvis was performed, without IV contrast, as per department protocol. Axia
l, sagittal and coronal reconstructions were obtained. One or more of the following dose reduction

techniques were used: Automated exposure control, adjustment of the mA and kV according to the patien
t size, and iterative reconstruction. Unless otherwise specified, incidental findings do not

require dedicated imaging follow-up. 



COMPARISON: 2/26/2009. 7/21/2021 renal ultrasound



FINDINGS:  



The lack of intravenous contrast limits the sensitivity of this exam for evaluation of solid visceral
 organs, vascular structures, and retroperitoneum.



LOWER CHEST: The visualized lung bases are clear.



LIVER: Normal in size and contour. There is parenchymal hypoattenuation suggesting steatosis No focal
 lesion.



BILIARY SYSTEM: Cholelithiasis. No pericholecystic fluid or fat stranding.



SPLEEN: Normal size.  No focal lesion.



PANCREAS: No mass, ductal dilation, or ara-pancreatic fluid.



ADRENALS: Normal; no mass.



KIDNEYS AND URETERS: Normal size and contour. No hydronephrosis.



URINARY BLADDER: Decompressed limiting evaluation..



GASTROINTESTINAL TRACT: No evidence of bowel obstruction, significant free fluid, free air or abscess
. Mild distal colonic diverticulosis



APPENDIX: Appendix not visualized, but no inflammatory changes in region of appendix.



LYMPH NODES: No lymphadenopathy.



MUSCULOSKELETAL: Superior endplate compression deformity at L1 without adjacent prevertebral swelling
, likely chronic. Nodular acute or suspicious osseous abnormality. Right femoral fixation hardware

in place.



ADDITIONAL FINDINGS: Saccular aneurysm of the infrarenal abdominal aorta projecting to the left measu
ring 4.3 cm greatest cc diameter at the base, and 1.4 cm to the level of the apex..



IMPRESSION: 



No acute or suspicious abnormalities in the abdomen or pelvis, with evaluation limited by lack of IV 
contrast. 



Incidental findings as above, including a saccular infrarenal aortic aneurysm.







Reported By: Sudarshan Melendez 

Electronically Signed:  10/1/2024 3:12 PM

## 2024-10-01 NOTE — XMS REPORT
Continuity of Care Document



                           Created on: 2024





LIZETTE MATAZABEMYESHA JONES

External Reference #: [id number]

: 1935

Sex: Female



Demographics





                                        Address             [address]

[address] 

[place]  [address]

 

                                        Home Phone          [phone]

 

                                        Mobile Phone        [phone]

 

                                        Email Address       [email or web address]

 

                                        Preferred Language  en

 

                                        Marital Status      Unknown

 

                                        Catholic Affiliation Unknown

 

                                        Race                Unknown

 

                                        Additional Race(s)  White

 

                                        Ethnic Group        Unknown





Author





                                        Name                Unknown

 

                                        Address             [address]

[place]  [address]

 

                                        [facility]

 

                                        Address             [address]

[address]

[place]  [address]

 

                                        Phone               [phone]





Support





                          Name         Relationship Address      Phone

 

                                Dina Mata Personal Relationship [address]

[place]  [address]                     [phone]





Care Team Providers





                                Care Team Member Name Role            Phone

 

                                Gaurav Carvalho Attending Clinician Unavailable







Payers





                    Payer Name Policy Type Policy Number Effective Date Expirati

on Date Source

 

                    [employer] MEDICARE C1        [id number]                      Commo

n [facility]







Problems





                                                    Condition 

Name                                    Condition 

Details                                 Condition 

Category                  Status                    Onset 

Date                                    Resolution 

Date                                    Last 

Treatment 

Date                                    Treating 

Clinician                 Comments                  Source

 

                                        [id number]           PAD 

(periphera

l artery 

disease) Problem                                                 [facility]

 

                                        [id number]            Essential 

hypertensi

on      Problem                                                 [facility]

 

                                        [id number]           COPD, 

moderate Problem                                                 [facility]

 

                                                    [id number]

[id number]                                   Type 2 

diabetes 

mellitus 

with 

hyperglyce

derrell, 

without 

long-term 

current 

use of 

insulin Problem                                                 [facility]

 

                                        [id number]            Coronary 

artery 

disease 

involving 

native 

coronary 

artery of 

native 

heart 

without 

angina 

pectoris Problem                                                 [facility]

 

                                        [id number]             Primary 

insomnia Problem                                                 [facility]

 

                                        [id number]           Post-traum

atic 

osteoarthr

itis of 

left knee Problem Active                                          [facility]







Social History





                    Social Habit Start Date Stop Date Quantity  Comments  Source

 

                    Sex Assigned At Birth                                       

  [facility]

 

                                                    History of Tobacco 

Use                                                 1995 

00:00:00                                                    [facility]







                          Smoking Status Start Date   Stop Date    Source

 

                          Former Smoker 2024 00:00:00 2024 00:00:00 

[facility]







Medications





                                                    Ordered 

Medication 

Name                                    Filled 

Medication 

Name                                    Start 

Date                                    Stop 

Date                                    Current 

Medication?                             Ordering 

Clinician       Indication      Dosage          Frequency       Signature 

(SIG)               Comments            Components          Source

 

                                                    Lisinopril 

10 MG                                   Lisinopril 

10 MG                                    

00:00:

00                        No                                     1{table

t}                        QD                        Lisinopril 

10 MG                                                       

 

                                                    Sulfamethox

azole-Trime

thoprim 

800-160 MG                              Sulfamethox

azole-Trime

thoprim 

800-160 MG                               

00:00:

00                        No                                     1{table

t}                        BID                       Sulfametho

xazole-Tri

methoprim 

800-160 MG                                                  

 

                                                    Clopidogrel 

Bisulfate                               Clopidogrel 

Bisulfate                 No                                      Clopidogre

l 

Bisulfate                                                   [facility]

 

                                                    Clopidogrel 

Bisulfate 

75 MG                                   Clopidogrel 

Bisulfate 

75 MG                   No                                      Clopidogre

l 

Bisulfate 

75 MG                                                       

 

                                                    Anoro 

Ellipta 

62.5-25 

MCG/ACT                                 Anoro 

Ellipta 

62.5-25 

MCG/ACT                 No                                      Anoro 

Ellipta 

62.5-25 

MCG/ACT                                                     

 

                                                    Ezetimibe 

10 MG                                   Ezetimibe 

10 MG                   No                                      Ezetimibe 

10 MG                                                       

 

                                                    Furosemide 

40 MG                                   Furosemide 

40 MG                   No                                      Furosemide 

40 MG                                                       

 

                                                    metFORMIN 

HCl  

MG                                      metFORMIN 

HCl  

MG                               No                               1{table

t}                        BID                       metFORMIN 

HCl  

MG                                                          







Vital Signs





                      Vital Name Observation Time Observation Value Comments   S

ource

 

                      height     2024 09:20:00 62 [in_i]             Commo

n [facility]

 

                      weight     2024 09:20:00 154.4 [lb_av]            Co

mmon [facility]

 

                      temperature 2024 09:20:00 97.2 [degF]            Com

mon [facility]

 

                      bmi        2024 09:20:00 28.24 kg/m2            Comm

on [facility]

 

                      oximetry   2024 09:20:00 96 %                  Commo

n [facility]

 

                      respiratory rate 2024 09:20:00 16 /min              

 [facility]

 

                                                    blood pressure 

systolic        2024 09:20:00 152 mm[Hg]                      [facility]

 

                                                    blood pressure 

diastolic       2024 09:20:00 74 mm[Hg]                       [facility]

 

                      height     2024 10:40:00 62 [in_i]             Commo

n [facility]

 

                      weight     2024 10:40:00 156.6 [lb_av]            Co

mmon [facility]

 

                      temperature 2024 10:40:00 97.3 [degF]            Com

[facility]

 

                      bmi        2024 10:40:00 28.64 kg/m2            Comm

on [facility]

 

                      oximetry   2024 10:40:00 96 %                  Commo

n [facility]

 

                                                    blood pressure 

systolic        2024 10:40:00 162 mm[Hg]                      [facility]

t [facility]

 

                                                    blood pressure 

diastolic       2024 10:40:00 78 mm[Hg]                       Common [facility]

 

                      height     2024 10:00:00 62 [in_i]             Commo

n [facility]

 

                      weight     2024 10:00:00 152.0 [lb_av]            Co

on [facility]

 

                      temperature 2024 10:00:00 97.4 [degF]            Com

[facility]

 

                      bmi        2024 10:00:00 27.8 kg/m2            Commo

n [facility]

 

                      oximetry   2024 10:00:00 96 %                  Commo

n [facility]

 

                      respiratory rate 2024 10:00:00 16 /min              

 Common [facility]

 

                                                    blood pressure 

systolic        2024 10:00:00 158 mm[Hg]                      [facility]

t [facility]

 

                                                    blood pressure 

diastolic       2024 10:00:00 76 mm[Hg]                       [facility]

[facility]

 

                      height     2020-11-10 10:00:00 62 [in_i]             Commo

n [facility]

 

                      weight     2020-11-10 10:00:00 166 [lb_av]            Comm

on [facility]

 

                      temperature 2020-11-10 10:00:00 97.1 [degF]            Com

mon [facility]

 

                      bmi        2020-11-10 10:00:00 30.36 kg/m2            Comm

on [facility]

 

                                                    blood pressure 

systolic        2020-11-10 10:00:00 142 mm[Hg]                      [facility]

[facility]

 

                                                    blood pressure 

diastolic       2020-11-10 10:00:00 82 mm[Hg]                       [facility]







Encounters





                                                    Start 

Date/Time                               End 

Date/Time                               Encounter 

Type                                    Admission 

Type                                    Attending 

Clinicians                              Care 

Facility                                Care 

Department                              Encounter 

ID                                      Source

 

                                                    2024 

07:59:00                        Outpatient                      Gaurav Carvalho             [facility]              [facility]              [id number]

[id number]                                   [facility]

 

                                                    2024 

13:49:00                        Outpatient                      Gaurav Carvalho             [facility]              [facility]              [id number]

[id number]                                   [facility]

 

                                                    2024 

10:33:00                        Outpatient                      Gaurav Carvalho             [facility]              [facility]              [id number]

[id number]                                   [facility]

 

                                                    2024-08-15 

08:22:00            Outpatient                     [facility]    [facility]    [id number]

2

[id number]                                   [facility]

 

                                                    2022 

09:02:01            Outpatient                     [facility]    [facility]    [id number]

2

[id number]                                   [facility]

 

                                                    2022 

12:04:05            Outpatient                     [facility]    [facility]    [id number]

2

[id number]                                   [facility]

 

                                                    2022 

12:03:40            Outpatient                     [facility]    [facility]    [id number]

2

[id number]                                   [facility]

 

                                                    2024 

00:00:00                                2024 

00:00:00                                OFFICE 

VISIT 

ESTAB PT 

LEVEL 4                          [facility]     [facility]     [id number]    [facility]

 

                                                    2024 

00:00:00                                2024 

00:00:00                                OFFICE 

VISIT 

ESTAB PT 

LEVEL 3                          [facility]     [facility]     [id number]    [facility]

 

                                                    2024 

00:00:00                                2024 

00:00:00                                OFFICE 

VISIT NEW 

PT LEVEL 4                       [facility]     [facility]     [id number]    [facility]

 

                                                    2020-11-10 

00:00:00                                2020-11-10 

00:00:00                                OFFICE 

VISIT NEW 

PT LEVEL 3                       [facility]     [facility]     [id number]    [facility]

## 2024-10-01 NOTE — RAD REPORT
EXAMINATION: Hip Left 2 View



CLINICAL INDICATION: Female, 88 years old. [facility] MAIN

 PAIN

 Bed Name: 



TECHNIQUE: 2 view radiograph of the left hip were obtained. 



COMPARISON: No prior exam.



FINDINGS: No evidence of fracture or dislocation. Normal alignment. Moderate hip joint arthropathy estrella
int space narrowing. Soft tissues are unremarkable.



IMPRESSION:

No acute abnormalities. Moderate arthropathy with joint space narrowing.



Reported By: Sudarshan Melendez 

Electronically Signed:  10/1/2024 4:12 PM

## 2024-10-03 ENCOUNTER — HOSPITAL ENCOUNTER (EMERGENCY)
Dept: HOSPITAL 97 - ER | Age: 89
Discharge: ANOTHER HOSPITAL | End: 2024-10-03
Payer: COMMERCIAL

## 2024-10-03 DIAGNOSIS — M54.50: ICD-10-CM

## 2024-10-03 DIAGNOSIS — K80.20: ICD-10-CM

## 2024-10-03 DIAGNOSIS — N18.6: ICD-10-CM

## 2024-10-03 DIAGNOSIS — R19.7: ICD-10-CM

## 2024-10-03 DIAGNOSIS — I71.40: Primary | ICD-10-CM

## 2024-10-03 DIAGNOSIS — E11.22: ICD-10-CM

## 2024-10-03 LAB
ALBUMIN SERPL BCP-MCNC: 3.4 G/DL (ref 3.4–5)
ALBUMIN/GLOB SERPL: 0.9 {RATIO} (ref 1.1–1.8)
ALP SERPL-CCNC: 92 U/L (ref 45–117)
ALT SERPL W P-5'-P-CCNC: 15 U/L (ref 13–56)
ANION GAP SERPL CALC-SCNC: 10.4 MEQ/L (ref 5–15)
AST SERPL W P-5'-P-CCNC: 14 U/L (ref 15–37)
BUN BLD-MCNC: 41 MG/DL (ref 7–18)
GLOBULIN SER CALC-MCNC: 4 G/DL (ref 2.3–3.5)
GLUCOSE SERPLBLD-MCNC: 180 MG/DL (ref 74–106)
HCT VFR BLD CALC: 39 % (ref 36–45)
HGB BLD-MCNC: 12.3 G/DL (ref 12–15)
LYMPHOCYTES # SPEC AUTO: 1.5 K/UL (ref 0.7–4.9)
MCH RBC QN AUTO: 28.8 PG (ref 27–35)
MCHC RBC AUTO-ENTMCNC: 31.5 G/DL (ref 32–36)
MCV RBC: 91.3 FL (ref 80–100)
NRBC # BLD: 0 10*3/UL (ref 0–0)
NRBC BLD AUTO-RTO: 0 % (ref 0–0)
PMV BLD: 7.7 FL (ref 7.6–11.3)
POTASSIUM SERPL-SCNC: 4.4 MEQ/L (ref 3.5–5.1)
RBC # BLD: 4.27 M/UL (ref 3.86–4.86)
WBC # BLD AUTO: 18.1 THOU/UL (ref 4.3–10.9)

## 2024-10-03 PROCEDURE — 80053 COMPREHEN METABOLIC PANEL: CPT

## 2024-10-03 PROCEDURE — 74177 CT ABD & PELVIS W/CONTRAST: CPT

## 2024-10-03 PROCEDURE — 82947 ASSAY GLUCOSE BLOOD QUANT: CPT

## 2024-10-03 PROCEDURE — 99285 EMERGENCY DEPT VISIT HI MDM: CPT

## 2024-10-03 PROCEDURE — 36415 COLL VENOUS BLD VENIPUNCTURE: CPT

## 2024-10-03 PROCEDURE — 76705 ECHO EXAM OF ABDOMEN: CPT

## 2024-10-03 PROCEDURE — 85025 COMPLETE CBC W/AUTO DIFF WBC: CPT

## 2024-10-03 NOTE — RAD REPORT
EXAM: Right upper quadrant ultrasound.



CLINICAL HISTORY: Abdominal pain



COMPARISON: October 3, 2024 CT



FINDINGS:



Multiple small gallstones.



The gallbladder is distended.



Gallbladder wall not thickened.



Biliary tree normal caliber



IMPRESSION:



Cholelithiasis



Gallbladder distention



Reported By: Moses Doherty 

Electronically Signed:  10/3/2024 11:52 AM

## 2024-10-03 NOTE — EDPHYS
Physician Documentation                                                                           

 [facility]                                                                 

Name: Dina Carson                                                                            

Age: 88 yrs                                                                                       

Sex: Female                                                                                       

: 1935                                                                                   

MRN: [id number]                                                                                   

Arrival Date: 10/03/2024                                                                          

Time: 09:52                                                                                       

Account#: [id number]                                                                            

Bed 15                                                                                            

Private MD:                                                                                       

ED Physician Igor Kolb                                                                         

HPI:                                                                                              

10/03                                                                                             

09:59 This 88 yrs old Female presents to ER via Unassigned with complaints of Nausea,         ms3 

      Diarrhea.                                                                                   

09:59 88-year-old female past medical history of diabetes, end-stage renal disease,           ms3 

      myocardial infarction presents to the emergency department for nausea, vomiting,            

      diarrhea that began at 4 AM. Patient denies abdominal pain. Patient states she was seen     

      2 days ago and diagnosed with a infrarenal aneurysm..                                       

                                                                                                  

Historical:                                                                                       

- Allergies:                                                                                      

09:57 No Known Allergies;                                                                     ll1 

- PMHx:                                                                                           

09:57 Diabetes - NIDDM; End stage renal disease; Myocardial infarction;                       ll1 

10:01 AAA;                                                                                    ll1 

- PSHx:                                                                                           

09:57 coronary stents; plates in left thigh and right hip;                                    ll1 

                                                                                                  

- Immunization history:: Adult Immunizations up to date.                                          

- Infectious Disease History:: Denies.                                                            

- Social history:: Smoking status: Patient denies any tobacco usage or history of.                

                                                                                                  

                                                                                                  

ROS:                                                                                              

09:59 Constitutional: Negative for fever, and chills. Cardiovascular: Negative for chest      ms3 

      pain, and palpitations. Respiratory: Negative for shortness of breath, cough, wheezing,     

      and pleuritic chest pain,                                                                   

09:59 MS/Extremity: Negative for injury and deformity, Skin: Negative for injury, rash, and       

      discoloration,                                                                              

09:59 Abdomen/GI: Positive for nausea, vomiting, and diarrhea,                                    

                                                                                                  

Exam:                                                                                             

09:59 Constitutional:  This is a well developed, well nourished patient who is awake, alert,  ms3 

      and in no acute distress. Chest/axilla:  Normal chest wall appearance and motion.           

      Nontender with no deformity.   Cardiovascular:  Regular rate and rhythm with a normal       

      S1 and S2.  No gallops, murmurs, or rubs.  Normal PMI, no JVD.  No pulse deficits.          

      Respiratory:  Lungs have equal breath sounds bilaterally, clear to auscultation and         

      percussion.  No rales, rhonchi or wheezes noted.  No increased work of breathing, no        

      retractions or nasal flaring. Skin:  Warm, dry with normal turgor.  Normal color with       

      no rashes, no lesions, and no evidence of cellulitis.                                       

09:59 Abdomen/GI: Inspection: abdomen appears normal, Bowel sounds: normal, Palpation: mild       

      abdominal tenderness, in the right lower quadrant and left lower quadrant,                  

                                                                                                  

Vital Signs:                                                                                      

09:57  / 80; Pulse 62; Resp 17; Temp 97.8; Pulse Ox 98% ;                               ll1 

10:55  / 56; Pulse 67; Pulse Ox 97% on R/A;                                             ll1 

12:16 Weight 71.21 kg;                                                                        bc6 

14:03  / 63; Pulse 59; Resp 16; Pulse Ox 100% on R/A;                                   cm10

                                                                                                  

MDM:                                                                                              

09:58 Patient medically screened.                                                             ms3 

09:59 Differential diagnosis: Nonspecific abd pain, diverticulitis, viral gastroenteritis,    ms3 

      gastroenteritis.                                                                            

12:38 ED course: Discussed case with Dr. Griggs and he will consult on patient. Recommends   ms3 

      admission to hospitalist group.                                                             

12:50 ED course: Discussed case with Dr Mao and he accepts to med surg bed..              ms3 

13:36 Data reviewed: vital signs, nurses notes, lab test result(s), radiologic studies, and   ms3 

      as a result, I will discharge patient. Consideration of Admission/Observation Will          

      transfer patient as [facility] does not have vascular surgery. Management of       

      patient was discussed with the following: Hospitalist: Dr Mao. Consultant: Dr Griggs. Historians other than the Patient: EMS: . Counseling: I had a detailed             

      discussion with the patient and/or guardian regarding the historical points, exam           

      findings, and any diagnostic results supporting the discharge/admit diagnosis, lab          

      results, radiology results, the need to transfer to another facility, [facility] does not immediately have the required specialist.                               

                                                                                                  

10/03                                                                                             

09:58 Order name: CBC with Diff; Complete Time: 10:50                                         ms3 

10/03                                                                                             

09:58 Order name: CMP; Complete Time: 10:50                                                   ms3 

10/03                                                                                             

14:13 Order name: Glucose, Ancillary Testing                                                  EDMS

10/03                                                                                             

09:58 Order name: CT Abd/Pelvis - IV Contrast Only; Complete Time: 10:50                      ms3 

10/03                                                                                             

10:52 Order name: US Abdomen Limited; Complete Time: 12:00                                    ms3 

10/03                                                                                             

09:58 Order name: IV Saline Lock; Complete Time: 09:59                                        ms3 

10                                                                                             

09:58 Order name: Labs collected and sent; Complete Time: 09:59                               ms3 

                                                                                                  

Administered Medications:                                                                         

No medications were administered                                                                  

                                                                                                  

                                                                                                  

Disposition Summary:                                                                              

10/03/24 12:11                                                                                    

Transfer Ordered                                                                                  

 Notes:       Transfer Location: [facility]                              
  ms3

      Reason: Higher level of care                                                            ms3 

      Condition: Stable                                                                       ms3 

      Problem: new                                                                            ms3 

      Symptoms: are unchanged                                                                 ms3 

      Accepting Physician: Dr Mao(10/03/24 14:19)                                         cm10

      Diagnosis                                                                                   

        - Abdominal aortic aneurysm, without rupture                                          ms3 

        - Low back pain                                                                       ms3 

        - Nausea with vomiting, unspecified                                                   ms3 

      Forms:                                                                                      

        - Medication Reconciliation Form                                                      ms3 

        - SBAR form                                                                           ms3 

Signatures:                                                                                       

Dispatcher [employer]                           EDMS                                                 

Kendy Silverman, RN                       RN   ll1                                                  

Igor Kolb,                         DO   ms3                                                  

Mimi Agarwal RN                  RN   cm10                                                 

                                                                                                  

Corrections: (The following items were deleted from the chart)                                    

10:53 10:53 Abdomen Limited+US.RAD.BRZ ordered. EDMS                                          SAIRAMS

12:50 12:11 Dr [email or web address]                                                                            ms3 

14:19 12:50 Dr Mao ms3                                                                    cm10

                                                                                                  

**************************************************************************************************

## 2024-10-03 NOTE — ER
Nurse's Notes                                                                                     

 [facility]                                                                 

Name: Dina Carson                                                                            

Age: 88 yrs                                                                                       

Sex: Female                                                                                       

: 1935                                                                                   

MRN: [id number]                                                                                   

Arrival Date: 10/03/2024                                                                          

Time: 09:52                                                                                       

Account#: [id number]                                                                            

Bed 15                                                                                            

Private MD:                                                                                       

Diagnosis: Abdominal aortic aneurysm, without rupture;Low back pain;Nausea with vomiting,         

  unspecified                                                                                     

                                                                                                  

Presentation:                                                                                     

10/03                                                                                             

09:57 Chief complaint: Patient states: Nausea and diarrhea started at 4 AM. Chief complaint:  ll1 

      EMS states: VSS. 18 G R AC. Zofran 4 MG IV given en route. Coronavirus screen: Client       

      denies travel out of the [place] in the last 14 days. diarrhea, fatigue, nausea, Client        

      presents with at least one sign or symptom that may indicate coronavirus-19.                

      Standard/surgical mask placed on the client. Ebola Screen: Patient denies travel to an      

      Ebola-affected area in the 21 days before illness onset. Initial Sepsis Screen: Does        

      the patient meet any 2 criteria? No. Patient's initial sepsis screen is negative. Does      

      the patient have a suspected source of infection? No. Patient's initial sepsis screen       

      is negative. Risk Assessment: Do you want to hurt yourself or someone else? Patient         

      reports no desire to harm self or others. Onset of symptoms was 2024.           

09:57 Acuity: REBEKAH 3                                                                           ll1 

09:57 Method Of Arrival: EMS: [facility]                                                 ll1 

                                                                                                  

Triage Assessment:                                                                                

09:59 General: Appears in no apparent distress. Behavior is calm, cooperative, appropriate    ll1 

      for age, Reports fatigue for. Pain: Denies pain. GI: Abdomen is flat, Bowel sounds          

      present X 4 quads. Abd is soft and non tender X 4 quads. Reports cramping, diarrhea,        

      nausea.                                                                                     

                                                                                                  

Historical:                                                                                       

- Allergies:                                                                                      

09:57 No Known Allergies;                                                                     ll1 

- PMHx:                                                                                           

09:57 Diabetes - NIDDM; End stage renal disease; Myocardial infarction;                       ll1 

10:01 AAA;                                                                                    ll1 

- PSHx:                                                                                           

09:57 coronary stents; plates in left thigh and right hip;                                    ll1 

                                                                                                  

- Immunization history:: Adult Immunizations up to date.                                          

- Infectious Disease History:: Denies.                                                            

- Social history:: Smoking status: Patient denies any tobacco usage or history of.                

                                                                                                  

                                                                                                  

Screening:                                                                                        

10:00 [facility] ED Fall Risk Assessment (Adult) History of falling in the last 3 months,       ll1 

      including since admission No falls in past 3 months (0 pts) Confusion or Disorientation     

      No (0 pts) Intoxicated or Sedated No (0 pts) Impaired Gait Yes (1 pt) Mobility Assist       

      Device Used No (0 pt) Altered Elimination Yes (1 pt) Score/Fall Risk Level 0 - 2 = Low      

      Risk Maintained a safe environment, Hourly rounding (assess needs \T\ fall precautionary    

      measures) done. Abuse screen: Denies threats or abuse. Nutritional screening: No            

      deficits noted. Tuberculosis screening: No symptoms or risk factors identified.             

                                                                                                  

Assessment:                                                                                       

10:32 Reassessment: No changes from previously documented assessment. back from CT.           ll1 

10:59 Reassessment: No changes from previously documented assessment. Patient and/or family   ll1 

      updated on plan of care and expected duration. Pain level reassessed. Patient is alert,     

      oriented x 3, equal unlabored respirations, skin warm/dry/pink. to US via wheelchair.       

11:22 Reassessment: back from US.                                                             ll1 

13:22 General: Attempt to call report, no answer..                                            cm10

13:44 General: Attempted to call report again.. placed on hold for 11 minutes,..              cm10

                                                                                                  

Vital Signs:                                                                                      

09:57  / 80; Pulse 62; Resp 17; Temp 97.8; Pulse Ox 98% ;                               ll1 

10:55  / 56; Pulse 67; Pulse Ox 97% on R/A;                                             ll1 

12:16 Weight 71.21 kg;                                                                        bc6 

14:03  / 63; Pulse 59; Resp 16; Pulse Ox 100% on R/A;                                   cm10

                                                                                                  

ED Course:                                                                                        

09:57 Patient arrived in ED.                                                                  ll1 

09:57 Igor Kolb DO is Attending Physician.                                                ms3 

09:57 Arm band placed on Patient placed in an exam room, on a stretcher.                      ll1 

09:59 Triage completed.                                                                       ll1 

09:59 Maintain EMS IV. Dressing intact. Good blood return noted. Site clean \T\ dry. Gauge \T\    ll
1

      site: 18 G R AC. Flushed with 10 mL NS.                                                     

10:00 Patient has correct armband on for positive identification. Bed in low position. Call   ll1 

      light in reach. Provided Education on: ER procedures and process.                           

10:01 Herrera, Lynsay, RN is Primary Nurse.                                                     ll1 

10:31 CT Abd/Pelvis - IV Contrast Only In Process Unspecified.                                EDMS

11:20 US Abdomen Limited In Process Unspecified.                                              EDMS

12:18 Primary Nurse role handed off by Kendy Silverman, BHARTI                                      cm10

12:18 Mimi Agarwal, RN is Primary Nurse.                                                cm10

12:19 spoke with Alayna at the [facility] transfer center.                                      bc6 

12:35 Doc to Doc with In[facility].                                                                bc6 

12:52 Kolb spoke with the hospitalist (CHING).                                              bc6 

13:10 ACCEPTANCE RECEIVED WITH ZEKE TO ROOM 1222 AT [facility] [address] [facility].        bc6 

13:36 HILARIO WITH [facility] ACCEPTED TRANSFER.                                                      bc6 

13:44 Assisted to bathroom.                                                                   kc6 

13:48 [facility] HERE.                                                                             bc6 

13:59 No provider procedures requiring assistance completed. Patient transferred, IV remains  cm10

      in place.                                                                                   

                                                                                                  

Administered Medications:                                                                         

No medications were administered                                                                  

                                                                                                  

                                                                                                  

Medication:                                                                                       

13:58 VIS not applicable for this client.                                                     cm10

                                                                                                  

Outcome:                                                                                          

12:11 ER care complete, transfer ordered by MD.                                               ms3 

13:58 Transferred by ground EMS [facility]. to [facility],           10

13:58 Condition: stable                                                                           

13:58 Instructed on the need for transfer,                                                        

14:19 Patient left the ED.                                                                    cm10

                                                                                                  

Signatures:                                                                                       

Dispatcher [employer]                           EDMS                                                 

Kendy Silverman, RN                       RN   ll1                                                  

Igor Kolb,                         DO   ms3                                                  

Alisa Pelayo, RN                   RN   kc6                                                  

Lissette Lynne                           bc6                                                  

Mimi Agarwal, RN                  RN   cm10                                                 

                                                                                                  

Corrections: (The following items were deleted from the chart)                                    

11:05 10:59 Reassessment: No changes from previously documented assessment. Patient and/or    ll1 

      family updated on plan of care and expected duration. Pain level reassessed. Patient is     

      alert, oriented x 3, equal unlabored respirations, skin warm/dry/pink. ll1                  

11:07 11:06  / 56; Pulse 67bpm; Pulse Ox 97% RA; ll1                                    ll1 

                                                                                                  

**************************************************************************************************

## 2024-10-03 NOTE — RAD REPORT
EXAMINATION: CT ABDOMEN AND PELVIS WITH CONTRAST



CLINICAL INDICATION: Abdominal pain



TECHNIQUE: CT abdomen and pelvis was performed, after the administration of 100 cc Isovue-300.. Sagit
carlos and coronal reconstructions were obtained. One or more of the following dose reduction

techniques were used: Automated exposure control, adjustment of the mA and/or  kV according to patien
t size, and/or  iterative reconstruction. Unless otherwise specified, incidental findings do not

require dedicated imaging follow-up. [id number]. Oral contrast was not given which limits evaluation of b
owel and appendix.

  

COMPARISON: October 1, 2024



FINDINGS: 



Several tiny right lung nodules.



Gallbladder distention. Increased density posterior aspect of the gallbladder.



The liver, spleen, pancreas, adrenals and kidneys appear unremarkable



There is no evidence of diverticulitis



4.2 cm saccular abdominal aortic aneurysm contains thrombus and ulcerated plaque.



Moderate compression deformity L1 vertebral body probably chronic

: 

IMPRESSION: 



4.2 cm saccular abdominal aortic aneurysm contains thrombus and an ulcerated plaque.Recommend CTA or 
MRA, as appropriate, in 12 months and referral to vascular specialist. 



Gallbladder distention. Increased density within the posterior aspect of the gallbladder may represen
t stones or sludge



Reported By: Moses Doherty 

Electronically Signed:  10/3/2024 10:43 AM

## 2024-10-03 NOTE — XMS REPORT
Continuity of Care Document



                           Created on: October 3, 2024





LIZETTE MATAZAMADAI JONES

External Reference #: [id number]

: 1935

Sex: Female



Demographics





                                        Address             [address]

[address] 

[place]  [address]

 

                                        Home Phone          [phone]

 

                                        Mobile Phone        [phone]

 

                                        Email Address       [email or web address]

 

                                        Preferred Language  en

 

                                        Marital Status      Unknown

 

                                        Mormonism Affiliation Unknown

 

                                        Race                Unknown

 

                                        Additional Race(s)  White

 

                                        Ethnic Group        Unknown





Author





                                        Name                Unknown

 

                                        Address             [address]

[place]  [address]

 

                                        [facility]

 

                                        Address             [address]

[address]

[place]  [address]

 

                                        Phone               [phone]





Support





                          Name         Relationship Address      Phone

 

                                Dina Mata Personal Relationship [address]

[place]  [address]                     [phone]





Care Team Providers





                                Care Team Member Name Role            Phone

 

                                Gaurav Carvalho Attending Clinician Unavailable







Payers





                    Payer Name Policy Type Policy Number Effective Date Expirati

on Date Source

 

                    [employer] MEDICARE C1        [id number]                      Commo

n [facility]







Problems





                                                    Condition 

Name                                    Condition 

Details                                 Condition 

Category                  Status                    Onset 

Date                                    Resolution 

Date                                    Last 

Treatment 

Date                                    Treating 

Clinician                 Comments                  Source

 

                                        [id number]           PAD 

(periphera

l artery 

disease) Problem                                                 [facility]

 

                                        [id number]            Essential 

hypertensi

on      Problem                                                 [facility]

 

                                        [id number]           COPD, 

moderate Problem                                                 [facility]

 

                                                    [id number]

[id number]                                   Type 2 

diabetes 

mellitus 

with 

hyperglyce

derrell, 

without 

long-term 

current 

use of 

insulin Problem                                                 [facility]

 

                                        [id number]            Coronary 

artery 

disease 

involving 

native 

coronary 

artery of 

native 

heart 

without 

angina 

pectoris Problem                                                 [facility]

 

                                        [id number]             Primary 

insomnia Problem                                                 [facility]

 

                                        [id number]           Post-traum

atic 

osteoarthr

itis of 

left knee Problem Active                                          [facility]







Social History





                    Social Habit Start Date Stop Date Quantity  Comments  Source

 

                    Sex Assigned At Birth                                       

  [facility]

 

                                                    History of Tobacco 

Use                                                 1995 

00:00:00                                                    [facility]







                          Smoking Status Start Date   Stop Date    Source

 

                          Former Smoker 2024 00:00:00 2024 00:00:00 

[facility]







Medications





                                                    Ordered 

Medication 

Name                                    Filled 

Medication 

Name                                    Start 

Date                                    Stop 

Date                                    Current 

Medication?                             Ordering 

Clinician       Indication      Dosage          Frequency       Signature 

(SIG)               Comments            Components          Source

 

                                                    Lisinopril 

10 MG                                   Lisinopril 

10 MG                                    

00:00:

00                        No                                     1{table

t}                        QD                        Lisinopril 

10 MG                                                       

 

                                                    Sulfamethox

azole-Trime

thoprim 

800-160 MG                              Sulfamethox

azole-Trime

thoprim 

800-160 MG                               

00:00:

00                        No                                     1{table

t}                        BID                       Sulfametho

xazole-Tri

methoprim 

800-160 MG                                                  

 

                                                    Clopidogrel 

Bisulfate                               Clopidogrel 

Bisulfate                 No                                      Clopidogre

l 

Bisulfate                                                   [facility]

 

                                                    Clopidogrel 

Bisulfate 

75 MG                                   Clopidogrel 

Bisulfate 

75 MG                   No                                      Clopidogre

l 

Bisulfate 

75 MG                                                       

 

                                                    Anoro 

Ellipta 

62.5-25 

MCG/ACT                                 Anoro 

Ellipta 

62.5-25 

MCG/ACT                 No                                      Anoro 

Ellipta 

62.5-25 

MCG/ACT                                                     

 

                                                    Ezetimibe 

10 MG                                   Ezetimibe 

10 MG                   No                                      Ezetimibe 

10 MG                                                       

 

                                                    Furosemide 

40 MG                                   Furosemide 

40 MG                   No                                      Furosemide 

40 MG                                                       

 

                                                    metFORMIN 

HCl  

MG                                      metFORMIN 

HCl  

MG                               No                               1{table

t}                        BID                       metFORMIN 

HCl  

MG                                                          







Vital Signs





                      Vital Name Observation Time Observation Value Comments   S

ource

 

                      height     2024 09:20:00 62 [in_i]             Commo

n [facility]

 

                      weight     2024 09:20:00 154.4 [lb_av]            Co

mmon [facility]

 

                      temperature 2024 09:20:00 97.2 [degF]            Com

mon [facility]

 

                      bmi        2024 09:20:00 28.24 kg/m2            Comm

on [facility]

 

                      oximetry   2024 09:20:00 96 %                  Commo

n [facility]

 

                      respiratory rate 2024 09:20:00 16 /min              

 [facility]

 

                                                    blood pressure 

systolic        2024 09:20:00 152 mm[Hg]                      [facility]

 

                                                    blood pressure 

diastolic       2024 09:20:00 74 mm[Hg]                       [facility]

 

                      height     2024 10:40:00 62 [in_i]             Commo

n [facility]

 

                      weight     2024 10:40:00 156.6 [lb_av]            Co

mmon [facility]

 

                      temperature 2024 10:40:00 97.3 [degF]            Com

[facility]

 

                      bmi        2024 10:40:00 28.64 kg/m2            Comm

on [facility]

 

                      oximetry   2024 10:40:00 96 %                  Commo

n [facility]

 

                                                    blood pressure 

systolic        2024 10:40:00 162 mm[Hg]                      [facility]

t [facility]

 

                                                    blood pressure 

diastolic       2024 10:40:00 78 mm[Hg]                       [facility]

 

                      height     2024 10:00:00 62 [in_i]             Commo

n [facility]

 

                      weight     2024 10:00:00 152.0 [lb_av]            Co

on [facility]

 

                      temperature 2024 10:00:00 97.4 [degF]            Com

[facility]

 

                      bmi        2024 10:00:00 27.8 kg/m2            Commo

n [facility]

 

                      oximetry   2024 10:00:00 96 %                  Commo

n [facility]

 

                      respiratory rate 2024 10:00:00 16 /min              

 Common [facility]

 

                                                    blood pressure 

systolic        2024 10:00:00 158 mm[Hg]                      [facility]

t [facility]

 

                                                    blood pressure 

diastolic       2024 10:00:00 76 mm[Hg]                       [facility]

[facility]

 

                      height     2020-11-10 10:00:00 62 [in_i]             Commo

n [facility]

 

                      weight     2020-11-10 10:00:00 166 [lb_av]            Comm

on [facility]

 

                      temperature 2020-11-10 10:00:00 97.1 [degF]            Com

mon [facility]

 

                      bmi        2020-11-10 10:00:00 30.36 kg/m2            Comm

on [facility]

 

                                                    blood pressure 

systolic        2020-11-10 10:00:00 142 mm[Hg]                      [facility]

[facility]

 

                                                    blood pressure 

diastolic       2020-11-10 10:00:00 82 mm[Hg]                       [facility]







Encounters





                                                    Start 

Date/Time                               End 

Date/Time                               Encounter 

Type                                    Admission 

Type                                    Attending 

Clinicians                              Care 

Facility                                Care 

Department                              Encounter 

ID                                      Source

 

                                                    2024 

07:59:00                        Outpatient                      Gaurav Carvalho             [facility]              [id number]

[id number]                                   [facility]

 

                                                    2024 

13:49:00                        Outpatient                      Gaurav Carvalho             [facility]              [facility]              [id number]

[id number]                                   [facility]

 

                                                    2024 

10:33:00                        Outpatient                      Garuav Carvalho             [facility]              [facility]              [id number]

[id number]                                   [facility]

 

                                                    2024-08-15 

08:22:00            Outpatient                     [facility]    [facility]    [id number]

2

[id number]                                   [facility]

 

                                                    2022 

09:02:01            Outpatient                     [facility]    [facility]    [id number]

2

[id number]                                   [facility]

 

                                                    2022 

12:04:05            Outpatient                     [facility]    [facility]    [id number]

2

[id number]                                   [facility]

 

                                                    2022 

12:03:40            Outpatient                     [facility]    [facility]    [id number]

2

[id number]                                   [facility]

 

                                                    2024 

00:00:00                                2024 

00:00:00                                OFFICE 

VISIT 

ESTAB PT 

LEVEL 4                          [facility]     [facility]     [id number]    [facility]

 

                                                    2024 

00:00:00                                2024 

00:00:00                                OFFICE 

VISIT 

ESTAB PT 

LEVEL 3                          [facility]     [facility]     [id number]    [facility]

 

                                                    2024 

00:00:00                                2024 

00:00:00                                OFFICE 

VISIT NEW 

PT LEVEL 4                       [facility]     [facility]     [id number]    [facility]

 

                                                    2020-11-10 

00:00:00                                2020-11-10 

00:00:00                                OFFICE 

VISIT NEW 

PT LEVEL 3                       [facility]     [facility]     [id number]    [facility]

## 2024-10-04 VITALS — DIASTOLIC BLOOD PRESSURE: 63 MMHG | SYSTOLIC BLOOD PRESSURE: 139 MMHG | OXYGEN SATURATION: 100 %

## 2024-10-04 VITALS — TEMPERATURE: 97.8 F
